# Patient Record
Sex: MALE | Race: WHITE | NOT HISPANIC OR LATINO | Employment: FULL TIME | URBAN - METROPOLITAN AREA
[De-identification: names, ages, dates, MRNs, and addresses within clinical notes are randomized per-mention and may not be internally consistent; named-entity substitution may affect disease eponyms.]

---

## 2022-01-26 ENCOUNTER — HOSPITAL ENCOUNTER (EMERGENCY)
Facility: HOSPITAL | Age: 32
Discharge: HOME | End: 2022-01-26
Attending: EMERGENCY MEDICINE | Admitting: EMERGENCY MEDICINE

## 2022-01-26 ENCOUNTER — APPOINTMENT (OUTPATIENT)
Dept: GENERAL RADIOLOGY | Facility: HOSPITAL | Age: 32
End: 2022-01-26

## 2022-01-26 VITALS
DIASTOLIC BLOOD PRESSURE: 65 MMHG | RESPIRATION RATE: 16 BRPM | HEIGHT: 69 IN | TEMPERATURE: 98 F | OXYGEN SATURATION: 100 % | HEART RATE: 85 BPM | SYSTOLIC BLOOD PRESSURE: 122 MMHG | WEIGHT: 137.57 LBS | BODY MASS INDEX: 20.38 KG/M2

## 2022-01-26 DIAGNOSIS — M79.601 RIGHT ARM PAIN: Primary | ICD-10-CM

## 2022-01-26 DIAGNOSIS — S52.601D CLOSED FRACTURE OF DISTAL END OF RIGHT ULNA WITH ROUTINE HEALING, UNSPECIFIED FRACTURE MORPHOLOGY, SUBSEQUENT ENCOUNTER: ICD-10-CM

## 2022-01-26 PROCEDURE — 73110 X-RAY EXAM OF WRIST: CPT

## 2022-01-26 PROCEDURE — 99282 EMERGENCY DEPT VISIT SF MDM: CPT

## 2022-01-27 NOTE — ED NOTES
"Patient states, \"I went [facility] and they did an xray and said that it looked good nothing had changed, and my doctor said that the splint would have protected it but the pain just hasn't gone away,\"     Nieves Gao, RN  01/26/22 2148    "

## 2022-01-27 NOTE — ED PROVIDER NOTES
Subjective   Patient is a 31-year-old male that presents to the emergency department today via POV, accompanied by his girlfriend, for right wrist pain.  Patient has a known right radius and ulna fracture.  There is a cast in place that was placed by his orthopedic surgeon in [facility].  Patient states tonight while he was working at a local [employer] facility he dropped something on his cast and he is afraid that he reinjured his wrist.  Prior to coming to this facility, patient visited another local ER tonight and an x-ray was completed.  Patient states he came here because he wanted a second opinion and for pain management.  Patient rates his current pain as a 9 on a scale of 0-10.  PMS intact and cap refill within normal limits to right upper extremity.  Initially patient has a follow-up appointment with his orthopedic surgeon in 2 days on Friday, 1/28/2022.          History provided by:  Patient   used: No        Review of Systems   Constitutional: Negative for chills and fever.   HENT: Negative for congestion, ear pain and sore throat.    Eyes: Negative for pain.   Respiratory: Negative for cough, chest tightness and shortness of breath.    Cardiovascular: Negative for chest pain.   Gastrointestinal: Negative for abdominal pain, diarrhea, nausea and vomiting.   Genitourinary: Negative for flank pain and hematuria.   Musculoskeletal: Negative for joint swelling.        Right wrist pain   Skin: Negative for pallor.   Neurological: Negative for seizures and headaches.   All other systems reviewed and are negative.      History reviewed. No pertinent past medical history.    No Known Allergies    History reviewed. No pertinent surgical history.    History reviewed. No pertinent family history.    Social History     Socioeconomic History   • Marital status: Single   Tobacco Use   • Smoking status: Current Every Day Smoker   • Smokeless tobacco: Never Used   Substance and Sexual Activity    • Alcohol use: Never   • Drug use: Never           Objective   Physical Exam  Vitals and nursing note reviewed.   Constitutional:       General: He is not in acute distress.     Appearance: Normal appearance. He is not toxic-appearing.   HENT:      Head: Normocephalic and atraumatic.      Mouth/Throat:      Mouth: Mucous membranes are moist.   Eyes:      General: No scleral icterus.  Cardiovascular:      Rate and Rhythm: Normal rate and regular rhythm.      Pulses: Normal pulses.      Heart sounds: Normal heart sounds.   Pulmonary:      Effort: Pulmonary effort is normal. No respiratory distress.      Breath sounds: Normal breath sounds.   Abdominal:      General: Abdomen is flat.      Palpations: Abdomen is soft.      Tenderness: There is no abdominal tenderness.   Musculoskeletal:         General: Signs of injury present. Normal range of motion.      Cervical back: Normal range of motion and neck supple.      Comments: Right wrist pain.  There is a cast in place so palpation cannot be completed to assess for tenderness.  PMS intact and cap refill within normal limits.  Skin is pink warm and dry.  Acceptable range of motion.   Skin:     General: Skin is warm and dry.      Capillary Refill: Capillary refill takes 2 to 3 seconds.   Neurological:      Mental Status: He is alert and oriented to person, place, and time. Mental status is at baseline.         Procedures           ED Course  ED Course as of 01/26/22 2336   Wed Jan 26, 2022 2207 The facility that patient visited just prior to arriving here in this emergency department, [facility] in [facility], was contacted by myself to obtain the final reads of the x-ray that was completed there so that they could be compared and patient could be given the second opinion that he is requesting.  The provider at that facility also advised that patient was given pain medication while a patient there and also states that they called him in  a prescription for [place] 5 at his local pharmacy. [MS]      ED Course User Index  [MS] Yulia Hearnison, APRN                                                 MDM  Number of Diagnoses or Management Options  Closed fracture of distal end of right ulna with routine healing, unspecified fracture morphology, subsequent encounter: established and improving  Right arm pain: established and improving  Diagnosis management comments: Patient is a 31-year-old male with a known right nondisplaced ulna fracture.  Patient presented with cast in place that was placed by his orthopedic physician at an earlier date.  Patient states he thinks he reinjured his arm today.  He went to another facility just prior to arriving at this 1 but states he came here for second opinion because he has concerns that he has reinjured his arm.  X-ray read was obtained from the other facility to compare to the x-ray read completed tonight at this facility and was relatively the same - there was no new acute fracture and fracture appear to be healing well.  Patient was also concerned about pain management.  The provider at the facility that he just saw confirmed that they called the patient in a [place] prescription at his local pharmacy.  Patient was updated with this information and encouraged to obtain his pain medications that were just previously prescribed to him from another provider.  Additionally patient has a follow-up appointment with his orthopedic surgeon in 2 days on Friday 1/28/2022 [id number].  He was highly encouraged to keep that appointment.  Patient as well as his visitor (girlfriend) verbalized understanding.  PMS intact and cap refill within normal limits to right upper extremity throughout entire ER visit.    I have spoke with the patient and I have explained the patient´s condition, diagnoses and treatment plan based on the information available to me at this time. I have answered all questions and addressed any concerns. The  patient has a good understanding of the patient´s diagnosis, condition, and treatment plan as can be expected at this point. The vital signs have been stable. The patient´s condition is stable and appropriate for discharge from the emergency department.      The patient will pursue further outpatient evaluation with the primary care physician or other designated or consulting physician as outlined in the discharge instructions. They are agreeable to this plan of care and follow-up instructions have been explained in detail. The patient has received these instructions in written format and have expressed an understanding of the discharge instructions. The patient is aware that any significant change in condition or worsening of symptoms should prompt an immediate return to this or the closest emergency department or call to 911.         Amount and/or Complexity of Data Reviewed  Tests in the radiology section of CPT®: reviewed and ordered  Decide to obtain previous medical records or to obtain history from someone other than the patient: yes (Patient's previous emergency room visit at another local hospital, [facility], was contacted and the results of his x-ray that was completed just prior to arriving at this facility was obtained so that they can be compared)  Review and summarize past medical records: yes (I have personally reviewed patient's previous medical encounters.  )    Risk of Complications, Morbidity, and/or Mortality  Presenting problems: low  Diagnostic procedures: low  Management options: low        Final diagnoses:   Right arm pain   Closed fracture of distal end of right ulna with routine healing, unspecified fracture morphology, subsequent encounter       ED Disposition  ED Disposition     ED Disposition Condition Comment    Discharge Stable           Your Ortho MD on Friday at 08:30    On 1/28/2022           Medication List      No changes were made to your prescriptions during  this visit.          Yulia Hearn, APRN  01/26/22 1289

## 2022-01-27 NOTE — DISCHARGE INSTRUCTIONS
Please keep your scheduled appointment with your orthopedic doctor in [facility] on Friday at 8:30 AM.  Please  the pain medication at your local pharmacy that was called in for you from the previous hospital and take them as directed.  Return to the ER or your orthopedic doctor's office if you develop worsening of pain, numbness and tingling to fingers, noticed that your fingers turn a different color, or any other concern surrounding your fracture.